# Patient Record
Sex: MALE | Race: WHITE | NOT HISPANIC OR LATINO | URBAN - METROPOLITAN AREA
[De-identification: names, ages, dates, MRNs, and addresses within clinical notes are randomized per-mention and may not be internally consistent; named-entity substitution may affect disease eponyms.]

---

## 2023-11-09 ENCOUNTER — OFFICE (OUTPATIENT)
Dept: URBAN - METROPOLITAN AREA CLINIC 13 | Facility: CLINIC | Age: 33
End: 2023-11-09
Payer: COMMERCIAL

## 2023-11-09 VITALS
HEIGHT: 71 IN | OXYGEN SATURATION: 98 % | HEART RATE: 77 BPM | DIASTOLIC BLOOD PRESSURE: 84 MMHG | WEIGHT: 214 LBS | SYSTOLIC BLOOD PRESSURE: 124 MMHG

## 2023-11-09 DIAGNOSIS — R74.01 ELEVATION OF LEVELS OF LIVER TRANSAMINASE LEVELS: ICD-10-CM

## 2023-11-09 DIAGNOSIS — R05.9 COUGH, UNSPECIFIED: ICD-10-CM

## 2023-11-09 DIAGNOSIS — K21.9 GASTRO-ESOPHAGEAL REFLUX DISEASE WITHOUT ESOPHAGITIS: ICD-10-CM

## 2023-11-09 PROCEDURE — 99204 OFFICE O/P NEW MOD 45 MIN: CPT | Performed by: INTERNAL MEDICINE

## 2023-11-09 NOTE — SERVICENOTES
Patient and I discussed acid reflux lifestyle modifications including avoiding alcohol and cigarettes.  We also discussed avoiding eating late at night and avoid eating large meals.  We discussed the fact that high fat foods, spicy foods, tomato-based food items, red wine, regular cola and laying down soon after eating can worsen acid reflux.  We discussed elevating the head of the bed by about 6 inches to aid in any nighttime regurgitation symptoms.

## 2023-11-09 NOTE — SERVICEHPINOTES
Guero Johnson   is a   33   year old   male   patient who is seen   at the request of   Chuy Urrutia   for a consultation/initial visit.  G gastro/[facility]/referral data is reviewed prior to office visit/consultation. Past medical history, medications, surgical history family history and social history are reviewed on this visit. Patient notedTypical retrosternal burning reflux-like symptoms. These been going on forAbout 3 years that initially started asNonproductive cough.Initially there is some concern his symptoms might relate to his heart. He had a Holter monitor and electrocardiogram that were apparently normal back then.He was tried on omeprazole a couple years ago.Initially helped his symptoms though it seemed to stop working.No dysphagia.He does have heartburn symptoms about 3 times a week.Patient denies rectal bleeding, lower abdominal pain,Generally it sounds like his heartburn is the worst toward the end of the day.No obvious postnasal drip or wheezing constipation or diarrhea.

## 2024-01-29 ENCOUNTER — OFFICE (OUTPATIENT)
Dept: URBAN - METROPOLITAN AREA CLINIC 13 | Facility: CLINIC | Age: 34
End: 2024-01-29
Payer: COMMERCIAL

## 2024-01-29 VITALS
SYSTOLIC BLOOD PRESSURE: 134 MMHG | HEART RATE: 82 BPM | DIASTOLIC BLOOD PRESSURE: 72 MMHG | HEIGHT: 71 IN | WEIGHT: 213 LBS | OXYGEN SATURATION: 97 %

## 2024-01-29 DIAGNOSIS — R05.9 COUGH, UNSPECIFIED: ICD-10-CM

## 2024-01-29 DIAGNOSIS — R74.01 ELEVATION OF LEVELS OF LIVER TRANSAMINASE LEVELS: ICD-10-CM

## 2024-01-29 DIAGNOSIS — E66.9 OBESITY, UNSPECIFIED: ICD-10-CM

## 2024-01-29 DIAGNOSIS — K21.9 GASTRO-ESOPHAGEAL REFLUX DISEASE WITHOUT ESOPHAGITIS: ICD-10-CM

## 2024-01-29 PROCEDURE — 99213 OFFICE O/P EST LOW 20 MIN: CPT | Performed by: INTERNAL MEDICINE

## 2024-01-29 NOTE — SERVICEHPINOTES
Guero  Michelineon   is seen today for a follow-up visit.     G gastro/[facility]/referral data is reviewed prior to office visit/consultation. Past medical history, medications, surgical history family history and social history are reviewed on this visit. Patient has background history of GERD this is doing well on twice a day PPI therapy. He also notes that his cough is a lot better.

## 2024-01-29 NOTE — SERVICENOTES
Stay on your twice a day heartburn medicine for another 6 weeks or so then drop it down to once a day and see how you do.

## 2024-07-29 ENCOUNTER — OFFICE (OUTPATIENT)
Dept: URBAN - METROPOLITAN AREA CLINIC 13 | Facility: CLINIC | Age: 34
End: 2024-07-29
Payer: COMMERCIAL

## 2024-07-29 VITALS
HEART RATE: 90 BPM | WEIGHT: 205 LBS | OXYGEN SATURATION: 99 % | SYSTOLIC BLOOD PRESSURE: 130 MMHG | HEIGHT: 71 IN | DIASTOLIC BLOOD PRESSURE: 82 MMHG

## 2024-07-29 DIAGNOSIS — K21.9 GASTRO-ESOPHAGEAL REFLUX DISEASE WITHOUT ESOPHAGITIS: ICD-10-CM

## 2024-07-29 DIAGNOSIS — R19.4 CHANGE IN BOWEL HABIT: ICD-10-CM

## 2024-07-29 PROCEDURE — 99214 OFFICE O/P EST MOD 30 MIN: CPT | Performed by: INTERNAL MEDICINE

## 2024-07-29 NOTE — SERVICEHPINOTES
Guero Johnson   is seen today for a follow-up visit.     G gastro/[facility]/referral data is reviewed prior to office visit/consultation.  Past medical history, medications, surgical history family history and social history are reviewed on this visit.  Patient has background history of GERD he is having a bit of breakthrough lately on once a day Protonix we discussed going to twice a day.  No dysphagia.  He is having a bit of nausea.  He does occasionally take some NSAIDs.  No rectal bleeding no melena.  He has been having some diarrhea.  This started in April or so.  There is significant urgency.  Typically 2 bowel movements a day no obvious rectal bleeding.  Some lower abdominal cramping usually improved by moving his bowels.  Bowel movements are soft.  He has diarrhea started some months after PPI therapy was started.

## 2024-07-29 NOTE — SERVICENOTES
We discussed the need to avoid diarrhea provoking foods such as milk and ice cream, that is lactose, as well as high fructose corn syrup such as regular soda and sweet tea.  We also discussed the need to avoid high fat food items as well as high amounts of caffeine and highly spiced items which sometimes contribute to diarrhea.  Taken Imodium before social situations